# Patient Record
(demographics unavailable — no encounter records)

---

## 2025-04-15 NOTE — PHYSICAL EXAM
[de-identified] : General: patient is well developed, well nourished, in no acute  distress, alert and oriented x 3.   Mood and affect: normal  Respiratory: no respiratory distress noted  Alignment:The spine is well compensated in the coronal and sagittal plane.  Gait: The patient is able to toe walk and heel walk without difficulty.   Palpation: no tenderness to palpation spine or paraspinal region  Range of motion: Lumbar spine ROM is restricted  Neurologic Exam: Motor: Manual Muscle testing in the lower extremities is 5 out of 5 in all muscle groups. There is no evidence of muscular atrophy in the lower extremities. Sensory: Sensation to light touch is grossly intact in the lower extremities  Reflexes: DTR are present and symmetric throughout, negative mas bilaterally, negative inverted radial reflex bilaterally, no clonus, plantar responses are flexor  Hip Exam: No pain with internal or external rotation of hips bilaterally  Special tests: Straight leg raise test negative.  Cross straight leg test negative.  [de-identified] : XR full spine ap/lateral, lumbar flexion/extension 4/15/25 (my read): severe L5/S1 disc degeneration, moderate/severe at L3/L4 and L4/L5, no spondylolisthesis or dynamic instability, no scoliosis, no acute fractures seen  MRI lumbar 3/14/24 (my read): L5/S1 severe disc degeneration with modic changes, severe bilateral foraminal stenosis; L4/L5 disc degeneration with modic changes, disc bulge and facet arthropathy with severe left > right foraminal stenosis; L3/L4 disc degeneration with modic changes, severe right foraminal stenosis

## 2025-04-15 NOTE — DISCUSSION/SUMMARY
[de-identified] : Discussed my findings with the patient. Discussed surgical and nonsurgical treatment options. Patient would like to consider conservative measures at this time. Last MRI is now over 1 year old. Recommending updated MRI lumbar spine without contrast, order given. Follow up with me after imaging has been completed, sooner if there is an issue. All questions answered.

## 2025-04-15 NOTE — HISTORY OF PRESENT ILLNESS
[de-identified] : Mr. CASTILLO is a very pleasant 49 year old male who complains of low back pain for several years, predominantly left sided, atraumatic onset. Pain radiates to both buttocks and to left posterior thigh and lateral leg. He had DAKOTAH a few years ago with temporary relief. Completed course of PT last year without improvement. Has been diligently doing home exercises and taking NSAIDs for months without improvement. Current pain significantly interfering with daily activities, unable to exercise.   The patient no history of previous spine surgery.   The patient has no history of unexpected weight loss, no history of active cancer, no history bladder or bowel dysfunction, no night pain, no fevers or chills.   The past medical history, surgical history, family history, allergies, medications, 10+ point review of systems, family history and social history were reviewed and non contributory.

## 2025-04-15 NOTE — END OF VISIT
[FreeTextEntry3] :   This note was written by Susan Zaidi PA-C, acting as a scribe for Dr. Alvin Lopez. I, Dr. Alvin Lopez, have read and attest that all the information, medical decision-making, and discharge instructions within are true and accurate.  I, Dr. Alvin Lopez, personally performed the evaluation and management (E/M) services for this new patient. That E/M includes conducting the initial examination, assessing all conditions, and establishing the plan of care. Today, my ACP, Susan Zaidi PA-C, was here to observe my evaluation and management services for this patient to be followed going forward.